# Patient Record
(demographics unavailable — no encounter records)

---

## 2024-10-16 NOTE — ASSESSMENT
[FreeTextEntry1] : will get carotid doppler as pt has requested it, has not had it in the past, last LDL was at goal of 95, HDL low at 40,

## 2024-10-16 NOTE — PHYSICAL EXAM
[Normal Sclera/Conjunctiva] : normal sclera/conjunctiva [Normal Outer Ear/Nose] : the outer ears and nose were normal in appearance [No JVD] : no jugular venous distention [No Respiratory Distress] : no respiratory distress  [No Accessory Muscle Use] : no accessory muscle use [Clear to Auscultation] : lungs were clear to auscultation bilaterally [Normal Rate] : normal rate  [Regular Rhythm] : with a regular rhythm [Normal S1, S2] : normal S1 and S2 [Normal] : normal rate, regular rhythm, normal S1 and S2 and no murmur heard [No Edema] : there was no peripheral edema [Soft] : abdomen soft [Non Tender] : non-tender [Normal Bowel Sounds] : normal bowel sounds [No Focal Deficits] : no focal deficits [Normal Affect] : the affect was normal [Normal Insight/Judgement] : insight and judgment were intact [Normal TMs] : both tympanic membranes were normal [No Murmur] : no murmur heard [No Carotid Bruits] : no carotid bruits [No Masses] : no abdominal mass palpated [Normal Posterior Cervical Nodes] : no posterior cervical lymphadenopathy [Normal Anterior Cervical Nodes] : no anterior cervical lymphadenopathy [No CVA Tenderness] : no CVA  tenderness [No Spinal Tenderness] : no spinal tenderness [No Joint Swelling] : no joint swelling [Grossly Normal Strength/Tone] : grossly normal strength/tone [No Rash] : no rash [Coordination Grossly Intact] : coordination grossly intact [Speech Grossly Normal] : speech grossly normal [Normal Mood] : the mood was normal [de-identified] : reg at present [de-identified] : rectus diastesis

## 2024-10-16 NOTE — HISTORY OF PRESENT ILLNESS
[PMH Reviewed and Updated] : past medical history reviewed and updated [PSH Reviewed and Updated] : past surgical history reviewed and updated [Family History Reviewed and Updated] : family history reviewed and updated [Medication and Allergies Reconciled] : medication and allergies reconciled [0] : 0 [Retired] : retired from work [None] : The patient has no concerns about alcohol abuse [Never] : has never used illicit drugs [Walking] : walking [Compliant with medications] : compliant with medications [Spouse] : spouse [Children] : children [Extended Family] : extended family [Fully Independent] : fully independent [Drives without concerns] : drives without concerns [No history of falls] : no history of falls [Seatbelts] : seatbelts [Safe Driving Habits] : safe driving habits [Smoke Detectors] : smoke detectors [Carbon Monoxide Detector] : carbon monoxide detector [Hot Water Temp <120F] : hot water temp <120F [Sunscreen] : sunscreen [Patient Has Full Capacity] : this patient has full decision making capacity for discussion of advance care planning [Patient Has Designated Health Care Proxy/Advanced Directive] : patient has designated Health Care Proxy/Advanced Directive [Over the Past 2 Weeks, Have You Felt Down, Depressed, or Hopeless?] : 1.) Over the past 2 weeks, have you felt down, depressed, or hopeless? No [Over the Past 2 Weeks, Have You Felt Little Interest or Pleasure Doing Things?] : 2.) Over the past 2 weeks, have you felt little interest or pleasure doing things? No [FreeTextEntry2] : none [de-identified] : none [de-identified] : none [FreeTextEntry1] : annual wellness [de-identified] : Pt is here for annual wellness. He has been seeing Dr Erwin for back pain and weakness of the legs.  Has not exercised due to the pains.  No cardiac complaints at present. Recently took methlyprednisone that he had from his rheumatologist which helped his pain. . No recent issues with bleeding or bruising, complaint with meds

## 2024-10-23 NOTE — DISCUSSION/SUMMARY
[de-identified] : Chief complaint Back pain follow-up  HPI Patient is a very pleasant 71-year-old here for follow-up of back pain.  The patient comes with an MRI.  He has leg pain bilateral buttock hamstring to the calf.  Pain is worse with standing and walking.  Feels better when he sits.  He had epidurals in the past.  No weakness.  No issues with gait or balance bowel bladder incontinence.  Examination Patient is NAD, AAOX3 skin is intact, NTTP in LS SPINE mild pain with ROM 5/5 motor strength in BLE SILT L1-S1 BLE POSITIVE straight leg raise Negative leslie equal patella/achilles reflex normal gait  Imaging MRI of the lumbar spine demonstrates L3-4 central disc herniation at acute, moderate stenosis, L5-S1 DDD with neuroforaminal stenosis, L2 L3-L4-L5 neuroforaminal stenosis moderate.  No fracture.  Treatment note I discussed at length with the patient nature of the condition.  The patient presents with back and bilateral leg issues, and the MRI does demonstrate stenosis most pronounced at the L3-4 L4-5 level.  At this time we will begin with gabapentin 300 mg and the patient will see Bessie for a epidural.  We discussed that he will need to be off the blood thinner.  If he fails the injections, he is a candidate for surgery.  All the patient's questions were sought and answered.

## 2024-11-04 NOTE — HISTORY OF PRESENT ILLNESS
[Back Pain] : back pain [___ mths] : [unfilled] month(s) ago [Constant] : constant [5] : an average pain level of 5/10 [0] : a minimum pain level of 0/10 [7] : a maximum pain level of 7/10 [Aching] : aching [Shooting] : shooting [Sitting] : sitting [Standing] : standing [Walking] : walking [Rest] : rest [FreeTextEntry1] : HPI: Mr. LILI DENNIS is a 72 year M on Xarelto with pmhx of DVT x 3, PAF, RTK replacement, hx prostate CA. Complaints of anterior thigh pain, right greater than left worse with prolonged sitting, walking, standing. Does not wish to take medications due to concern of side effects. Pain is severe and impacting his quality of life and ability to perform ADL's. Denies any additional weakness, numbness, bowel/bladder dysfunction, history of bleeding disorders. Denies any additional weakness, numbness, bowel/bladder dysfunction, history of bleeding disorders. Has had shoulder and knee replacement surgery as well as achilles repair. There has been a severe exacerbation of the patient's chronic pain.

## 2024-11-04 NOTE — ASSESSMENT
[FreeTextEntry1] : 72 yom presents w/ severe back and leg pain. Quality of life is impaired. There has been a severe exacerbation of the patient's chronic pain.  I have personally reviewed the patient's MRI in detail and discussed my personal interpretation of the study which is significant for severe stenosis at L3-L4.  Patient is on Eliquis. Will need to discuss with his prescribing provider whether the potential benefits of intervention outweigh the risk of discontinuing Eliquis for 72 hours prior to intervention.  The patient has failed to have relief with medication management. The patient has failed to have relief with more then six weeks of physical therapy within the last three months. Given the patients failure to improve with all other conservative measures, recommend bilateral L3-L4 transforaminal epidural steroid injection under fluoroscopic guidance. The patient will follow-up with me in my office two weeks following intervention.  I have discussed in detail with the patient that an interventional spine procedure is associated with potential risks. The procedure may include an injection of steroid and potentially other medications (local anesthetic and normal saline) into the epidural space or surrounding tissue of the spine. There are significant risks of this procedure which include and are not limited to infection, bleeding, worsening pain, dural puncture leading to post-dural puncture headache, nerve damage, spinal cord injury, paralysis, stroke, and death. There is a chance that the procedure does not improve their pain. There are risks associated with the steroid being absorbed into the body systemically. These include dysphoria, difficulty sleeping, mood swings, and personality changes. Pre-menopausal women may notice a regularity his in her menstrual cycle for 2-3 months following the injection. Steroids can specifically affect patients with hypertension, diabetes, and peptic ulcers. The procedure may cause a temporary increase in blood pressure and blood glucose, and may adversely affect a peptic ulcer. Other, more rare complications, including avascular necrosis of the joints, glaucoma, and osteoporosis. I have discussed the risks of the procedure at length with the patient, and the potential benefits of pain relief. I have offered alternatives to the procedure. All questions were answered. The patient expressed understanding and wishes to proceed with the procedure.  Physical therapy prescribed - goal will be to increase ROM, strengthening, postural training, other modalities ad gwen which may include massage and stim. Goals of therapy discussed with the patient in detail and will be discussed with physical therapist. Patient will follow-up following course of physical therapy to monitor progress and adjust therapy as needed.  Acetaminophen 1,000 mg q8h prn for moderate pain. Risks, benefits, and alternatives of acetaminophen discussed with patient.  Ibuprofen 600 mg q8h prn add when pain is not adequately controlled with acetaminophen. Risks, benefits, and alternatives of ibuprofen discussed with patient.  Diet and nutritional strategies discussed which may improve patients pain and will improve overall health.

## 2024-11-04 NOTE — REASON FOR VISIT
[Initial Consultation] : an initial pain management consultation [FreeTextEntry2] : Ref by Dr. Erwin

## 2024-11-04 NOTE — DATA REVIEWED
[FreeTextEntry1] : Exam Date:      10/13/24 Exam:         MRI LUMBAR SPINE   This exam is compared prior CT scan the abdomen performed on June 29, 2017   Reversal of the normal lumbar lordosis is seen   The vertebral body height appears normal   Disc desiccation is seen involving the T11-12, T12-L1, L2-3, L3-4, L4-5 and L5-S1 level. This is most prominent at the L5-S1 level and secondary chronic degenerative changes   There is increased signal seen involving the L1-2 disc space which could be secondary to demineralization that was present on the prior CT scan of the abdomen/pelvis.   L2 is slightly posterior displaced relative to L3 which likely the result of chronic degenerative changes   T12-L1: Disc bulge is seen which more prominent left side. Far lateral disc herniation is seen on the left side (16-6) mild narrowing spinal canal both neural foramen   L1-2: Bilateral hypertrophic facet joint changes. Mild narrowing of the spinal canal. Mild to moderate narrowing of the left neural foramen and moderate narrowing of the right neural foramen   L2-3: Disc bulge and bilateral hypertrophic facet. Moderate narrowing of the spinal canal. Moderate narrowing of the left neural foramen and severe narrowing of the right neural foramen   L3-4: Disc bulge is seen with associated annular fissure. Bilateral hypertrophic facet joint change. Moderate to severe narrowing of the spinal canal. Moderate narrowing of both neural foramen   L4-5: Disc bulge is seen with associated annular fissure. Bilateral hypertrophic facet changes. Moderate narrowing of the spinal canal. Moderate to severe narrowing of both neural foramen   L5-S1: Disc bulge and tiny central disc protrusion is seen. Bilateral hypertrophic facet joint change. Mild narrowing right neural foramen and moderate narrowing of the left neural foramen   The conus ends at bottom of L1 appears normal.   Elevation of the paraspinal soft tissues appear normal.   Bilateral renal cysts are identified   Small foci of decreased signal is seen involving both iliac bones. These finds correspond to sclerotic areas seen on the prior CT scan and could be compatible bone island though continued close interval can be done to show stability.   IMPRESSION: Degenerative changes as described above.

## 2024-11-04 NOTE — PHYSICAL EXAM
[de-identified] : Constitutional: Well-developed, in no acute distress  Musculoskeletal: Lumbar Spine:   Gait: Antalgic 		Inspection: Normal curvature, no abnormal kyphosis or scoliosis 		Facet loading: pain bilaterally 		Palpation: 			Lumbar and paraspinal muscles: pain bilaterally 			Sacroiliac joint: no pain 			Greater trochanter: no pain 		Muscle Strength: 		Iliopsoas: 5/5 bilaterally 		Quadriceps: 5/5 bilaterally 		Hamstrings: 5/5 bilaterally 		Tibialis anterior: 5/5 bilaterally 		Extensor hallucis longus: 5/5 bilaterally  		Sensation: normal and equal in bilateral lower extremities  Extremity: no edema noted Neurological: Memory normal, AAO x 3, Cranial nerves II - XII grossly normal Psychiatric: Appropriate mood and affect, oriented to time, place, person, and situation

## 2024-11-04 NOTE — CONSULT LETTER
[Dear  ___] : Dear  [unfilled], [Consult Letter:] : I had the pleasure of evaluating your patient, [unfilled]. [Please see my note below.] : Please see my note below. [Consult Closing:] : Thank you very much for allowing me to participate in the care of this patient.  If you have any questions, please do not hesitate to contact me. [Sincerely,] : Sincerely, [FreeTextEntry3] : Víctor Marsh MD

## 2024-11-22 NOTE — HISTORY OF PRESENT ILLNESS
[FreeTextEntry1] : 72-year-old man  Preoperative cardiovascular examination previously followed here for atrial fibrillation hypertension valvular heart disease hyperlipidemia and aortic dilatation.   Back pain attributed to severe lumbar stenosis led to a recommendation for an epidural steroid injection for treatment.  The  is Dr. Salma Ruffin denies symptoms of chest pain palpitation shortness of breath or syncope.  No bleeding complications while taking rivaroxaban.  Mr. Arroyo is accompanied today by his wife

## 2024-11-22 NOTE — DISCUSSION/SUMMARY
[FreeTextEntry1] : Preoperative cardiovascular examination. Back pain attributed to severe lumbar stenosis led to a recommendation for an epidural steroid injection for treatment.  The  is Dr. Marsh  Comment There is no cardiac contraindication to epidural injection.  There is no history of a myocardial infarction.  The patient is hemodynamically stable without any cardiac related symptoms.  There is no evidence of heart failure.  The preoperative 11/22/2024 electrocardiogram shows no evidence of myocardial ischemia or infarction.  As such procedure may be performed with an acceptable cardiac risk.  In an effort to decrease the risk of potential bleeding complications rivaroxaban may be held prior to the procedure and reinstituted as soon as feasible following its completion.  I have recommended the following Discontinue rivaroxaban 4 days prior to epidural injection Reinstitution of rivaroxaban as soon as feasible following the completion of the procedure at the direction of Dr. Marsh Further cardiac testing is not required Workup and treatment as directed by Dr. Marsh Call if any cardiovascular issues arise   Paroxysmal atrial fibrillation The working diagnosis is paroxysmal atrial fibrillation secondary to hypertensive heart disease exacerbated by obesity.  He has been maintained on flecainide/xarelto  with a favorable clinical response.  A 3/22 Zio patch showed sinus rhythm with no sustained rhythm disturbance.  No atrial fibrillation.  I have recommended the following: Continue flecainide and rivaroxaban No further cardiac testing for this problem at this time.    Hyperlipidemia Hyperlipidemia represents a risk factor for atherosclerotic heart disease.  The target LDL level for primary prevention is about 100.  In 10/24 the serum triglyceride level was 187 cholesterol 186 HDL 42 and .  The main clinical decision involves whether or not to institute antihyperlipidemic medical therapy.  I have recommended the following: Low-salt low-fat low-cholesterol heart healthy diet.  Regular aerobic exercise and weight loss No antihyperlipidemic medical therapy at this time Target LDL level to about 100     Hypertension Hypertension is reportedly controlled on the present medical regimen.  (verapamil) ACE-I/ARB therapy may be added to the present medical regimen if required to maintain optimal levels .  I have recommended the following: Continue the present medical regimen home blood pressure monitoring Addition of ACE-I/ARB therapy if required to maintain optimal levels as discussed above.    Valvular heart disease The 5/24 echocardiogram revealed mild aortic sclerosis with trivial-mild aortic and trace tricuspid regurgitation.  The pulmonary artery systolic pressure was normal at 32 mmHg.  The left ventricular ejection fraction was 65%.  The present cardiac physical  examination is not suggestive of hemodynamically significant valvular pathology.  I have recommended the following: No further cardiac testing for this problem    Ascending aortic dilatation. The 5/24 echocardiogram revealed a dilated aortic root at 3.7 cm.  And ascending aorta 4.3 cm.  Control of systemic hypertension will be important in an effort to avoid expansion of the aorta.  I have recommended the following: No further cardiac testing for this problem See hypertension entry above   The diagnosis, prognosis, risks, options and alternatives were explained at length to the patient and family.    All questions were answered.  Issues discussed included cardiology clearance prior to surgery atrial fibrillation hyperlipidemia ascending aortic dilatation noninvasive cardiac testing hypertension diet and exercise Counseling and/or coordination of care.  Time was a significant factor for this patient encounter.  Total time spent with the patient and family was 25 minutes.  Greater than 50% of the time was devoted to counseling and/or coordination of care

## 2024-11-22 NOTE — PHYSICAL EXAM
[Normal Conjunctiva] : normal conjunctiva [Normal S1, S2] : normal S1, S2 [Clear Lung Fields] : clear lung fields [Soft] : abdomen soft [Non Tender] : non-tender [Normal Gait] : normal gait [No Rash] : no rash [No Focal Deficits] : no focal deficits [de-identified] : Appears in no distress lying flat [de-identified] : Normocephalic [de-identified] : No neck vein distention.  No carotid bruit [de-identified] : No murmur no gallop no diastolic sounds. [de-identified] : Full range of motion [de-identified] : No peripheral edema.  Dorsalis pedis pulses +2 bilaterally

## 2024-12-02 NOTE — PROCEDURE
[FreeTextEntry1] : PROCEDURE: Bilateral L3-L4 transforaminal epidural steroid injection under fluoroscopic guidance.  CHIEF COMPLAINT: Low back and leg pain.    PREOPERATIVE DIAGNOSIS:	Lumbar radiculopathy.  POSTOPERATIVE DIAGNOSIS:  Lumbar radiculopathy. 	  ANESTHESIA:  Local.  COMPLICATIONS:  	None.  ESTIMATED BLOOD LOSS:  None.	  INDICATIONS: Mr. Arroyo is a very pleasant 72 yom who has significant low back and leg pain.  The patient has failed to have relief with medication management and physical therapy. Given their failure to improve with all other conservative measures, it was determined that the patient would benefit from a transforaminal epidural steroid injection under fluoroscopic guidance.    The patient denies any fevers, chills, nausea, vomiting, diarrhea, constipation, chest pain, shortness of breath, or burning on urination.  They deny any latex allergy.  They are not taking any anticoagulants and do not have a history of coagulopathy.  The patient denies any IV contrast allergy.       PROCEDURE COURSE: The risks, benefits, and alternatives of the lumbar interlaminar epidural steroid injection were explained to the patient.  All questions were answered to their own satisfaction.  Written consent was signed and placed in the chart. The patients low back was marked in the preoperative holding area.   The patient was brought to the Operating Room and placed in the prone position with a pillow under the abdomen to reduce lumbar lordosis.  A time-out was taken identifying the patient, the procedure, the site and side, and the patients allergies.  ASA standard monitors were applied for monitoring throughout the procedure.  The patients back was prepped and draped with Chloroprep in the usual sterile fashion and sterile technique was adhered to throughout the entire procedure.  The lumbar spine was visualized under fluoroscopy in the AP view. The 12th rib and T12 vertebra were identified as a reference point and the lumbar vertebral bodies were counted.  The L3 vertebral body was then squared. The vertebral body and the superior and inferior end plates were aligned and the spinous processes were adjusted until midline. The fluoroscope was then obliqued approximately twenty degrees to the side of the target foramen and the L3-L4 foramen was identified under fluoroscopic guidance. The skin and subcutaneous tissues were infiltrated with 1% Lidocaine.  After adequate local anesthesia was obtained, a 22g needle was advanced toward the foramen. The needle was carefully advanced, alternating between AP and lateral views, to ensure appropriate trajectory and depth. The needle was advanced just under the pedicle and this was confirmed on lateral view. Once the foramen was accessed, negative aspiration for blood and CSF was obtained, further confirming location. Following this, 1 cc of contrast was administered and epidural spread was confirmed in the AP and lateral views. Following this, 7.5 mg of dexamethasone and 1 cc of 1% lidocaine was slowly administered in incremental amounts.  Following this attention was turned to the right L3-L4 foramen. The same exact sequence of events was performed and the same exact medications administered as was done on the left L3-L4 foramen.   All injections were done with negative aspiration for cerebrospinal fluid or heme, and all needles were withdrawn without incident. I personally met with the patient following the procedure and all questions were answered. The patient tolerated the procedure well without any apparent complications and was transferred to the Recovery Room in stable condition. The patient was provided with discharge instructions and will follow-up with me in my office.  	___________________________________ 	Víctor Marsh M.D.

## 2024-12-16 NOTE — PHYSICAL EXAM
[de-identified] : Constitutional: Well-developed, in no acute distress  Musculoskeletal: Lumbar Spine:   Gait: Antalgic 		Inspection: Normal curvature, no abnormal kyphosis or scoliosis 		Facet loading: pain bilaterally 		Palpation: 			Lumbar and paraspinal muscles: pain bilaterally 			Sacroiliac joint: no pain 			Greater trochanter: no pain 		Muscle Strength: 		Iliopsoas: 5/5 bilaterally 		Quadriceps: 5/5 bilaterally 		Hamstrings: 5/5 bilaterally 		Tibialis anterior: 5/5 bilaterally 		Extensor hallucis longus: 5/5 bilaterally  		Sensation: normal and equal in bilateral lower extremities  Extremity: no edema noted Neurological: Memory normal, AAO x 3, Cranial nerves II - XII grossly normal Psychiatric: Appropriate mood and affect, oriented to time, place, person, and situation

## 2024-12-16 NOTE — ASSESSMENT
[FreeTextEntry1] : 72 yom presents w/ severe back and leg pain with resolution of pain after SABRINA.  I have personally reviewed the patient's MRI in detail and discussed my personal interpretation of the study which is significant for severe stenosis at L3-L4.  Patient is on Eliquis. Will need to discuss with his prescribing provider whether the potential benefits of intervention outweigh the risk of discontinuing Eliquis for 72 hours prior to intervention, if repeat SABRINA is performed.  Follow-up w/ Dr. Erwin.  Physical therapy prescribed - goal will be to increase ROM, strengthening, postural training, other modalities ad gwen which may include massage and stim. Goals of therapy discussed with the patient in detail and will be discussed with physical therapist. Patient will follow-up following course of physical therapy to monitor progress and adjust therapy as needed.  Acetaminophen 1,000 mg q8h prn for moderate pain. Risks, benefits, and alternatives of acetaminophen discussed with patient.  Ibuprofen 600 mg q8h prn add when pain is not adequately controlled with acetaminophen. Risks, benefits, and alternatives of ibuprofen discussed with patient.  Diet and nutritional strategies discussed which may improve patients pain and will improve overall health.  Based on the current evaluation and management, I will provide ongoing, longitudinal care for the complex painful condition described above. Patient is encouraged to reach out with any questions and/or concerns regarding their condition and care.

## 2024-12-16 NOTE — PHYSICAL EXAM
[de-identified] : Constitutional: Well-developed, in no acute distress  Musculoskeletal: Lumbar Spine:   Gait: Antalgic 		Inspection: Normal curvature, no abnormal kyphosis or scoliosis 		Facet loading: pain bilaterally 		Palpation: 			Lumbar and paraspinal muscles: pain bilaterally 			Sacroiliac joint: no pain 			Greater trochanter: no pain 		Muscle Strength: 		Iliopsoas: 5/5 bilaterally 		Quadriceps: 5/5 bilaterally 		Hamstrings: 5/5 bilaterally 		Tibialis anterior: 5/5 bilaterally 		Extensor hallucis longus: 5/5 bilaterally  		Sensation: normal and equal in bilateral lower extremities  Extremity: no edema noted Neurological: Memory normal, AAO x 3, Cranial nerves II - XII grossly normal Psychiatric: Appropriate mood and affect, oriented to time, place, person, and situation

## 2024-12-16 NOTE — HISTORY OF PRESENT ILLNESS
[Back Pain] : back pain [___ mths] : [unfilled] month(s) ago [Constant] : constant [5] : an average pain level of 5/10 [0] : a minimum pain level of 0/10 [7] : a maximum pain level of 7/10 [Aching] : aching [Shooting] : shooting [Sitting] : sitting [Standing] : standing [Walking] : walking [Rest] : rest [FreeTextEntry1] : Interval history: Pt returns today s/p SABRINA. He reports complete relief of pain. NO side effects.  HPI: Mr. LILI DENNIS is a 72 year M on Xarelto with pmhx of DVT x 3, PAF, RTK replacement, hx prostate CA. Complaints of anterior thigh pain, right greater than left worse with prolonged sitting, walking, standing. Does not wish to take medications due to concern of side effects. Pain is severe and impacting his quality of life and ability to perform ADL's. Denies any additional weakness, numbness, bowel/bladder dysfunction, history of bleeding disorders. Denies any additional weakness, numbness, bowel/bladder dysfunction, history of bleeding disorders. Has had shoulder and knee replacement surgery as well as achilles repair. There has been a severe exacerbation of the patient's chronic pain.  Interventions: Bilateral L3-L4 TFESI (12/02/24):

## 2024-12-18 NOTE — PHYSICAL EXAM
[de-identified] : right knee no effusion skin intact rom 0-135 without pain stable no effusion right hip with painful restricted rom neuro intact nl gait [de-identified] : ap/pa/lat/sunrise right knee shows well positioned tka  ap/frog right hip and pelvis shows medial and inferior narrowing right hip lateral cortex lesion right proximal femur

## 2024-12-18 NOTE — HISTORY OF PRESENT ILLNESS
[de-identified] : right knee pain for a few months had epidural a month or so ago and helped a little no f/c/ns no numbness or tingling no weight loss no trauma

## 2024-12-18 NOTE — DISCUSSION/SUMMARY
[de-identified] : pain possibly due to spine or hip lesion in femur need to get mri to eval cortex if benign then coming from hip or spine will consider diagnostic/theraputic injection

## 2025-01-03 NOTE — DISCUSSION/SUMMARY
[de-identified] : Chief complaint Follow-up  HPI The patient is here today for follow-up of his back pain.  The patient got an MRI of his right hip and now is having severe pain down his back into his right hip and right leg.  There is numbness and tingling.  It radiates into the hamstring to the posterolateral calf.  He has had 2 epidural injections.  He has back soreness but overall his biggest complaint is right leg.  No weakness.  No issues gait or balance bowel bladder incontinence.  Examination Patient is NAD, AAOX3 skin is intact, NTTP in LS SPINE mild pain with ROM 5/5 motor strength in BLE SILT L1-S1 BLE POSITIVE straight leg raise Negative leslie equal patella/achilles reflex normal gait  Imaging Review of previous MRI demonstrates L3-L4 severe stenosis with herniated disc and facet hypertrophy, L4-5 moderate stenosis.  There is L5-S1 DDD without significant neuroforaminal stenosis  Treatment note I discussed at length with the patient nature of the condition.  The patient presents with back and right leg pain but mostly right leg symptoms in the setting of L3-L5 stenosis.  At this time he is failed 2 injections.  The patient is a good candidate for a L3-L5 right-sided decompression and L3-4 discectomy.  Risk the procedure were discussed which include mount infection bleeding nerve injury injury to dural sac possible nonunion possible need for revision surgery risk of anesthesia clued death and paralysis.  After discussion of risk and benefits the patient provided consent to proceed.  The patient will need full medical clearance.  He needs cardiac clearance.  All the patient's questions were sought answered.

## 2025-01-08 NOTE — HISTORY OF PRESENT ILLNESS
[FreeTextEntry1] : 72-year-old man Preoperative cardiovascular examination Previously followed here for atrial fibrillation hypertension valvular heart disease hyperlipidemia and aortic dilatation.  Augustin carries a diagnosis of lumbar stenosis/lumbar disc disease.  Despite 2 epidural injections the last of which was in 12/24 persistent pain in the right hip/back into the right leg has persisted.  Symptoms are severe despite analgesics.  Surgical intervention has been advised by Dr. Erwin.  The operation is scheduled for 2/6/2025..  Augustin denies symptoms of chest pain, shortness of breath, palpitations or syncope.

## 2025-01-08 NOTE — PHYSICAL EXAM
[Normal Conjunctiva] : normal conjunctiva [Normal S1, S2] : normal S1, S2 [Clear Lung Fields] : clear lung fields [Soft] : abdomen soft [Non Tender] : non-tender [Normal Gait] : normal gait [No Rash] : no rash [No Focal Deficits] : no focal deficits [de-identified] : Appears in no distress lying flat [de-identified] : Normocephalic [de-identified] : No neck vein distention.  No carotid bruit [de-identified] : No murmur no gallop no diastolic sounds. [de-identified] : Full range of motion [de-identified] : No peripheral edema.  Dorsalis pedis pulses +2 bilaterally

## 2025-01-08 NOTE — PHYSICAL EXAM
[Normal Conjunctiva] : normal conjunctiva [Normal S1, S2] : normal S1, S2 [Clear Lung Fields] : clear lung fields [Soft] : abdomen soft [Non Tender] : non-tender [Normal Gait] : normal gait [No Rash] : no rash [No Focal Deficits] : no focal deficits [de-identified] : Appears in no distress lying flat [de-identified] : Normocephalic [de-identified] : No neck vein distention.  No carotid bruit [de-identified] : No murmur no gallop no diastolic sounds. [de-identified] : Full range of motion [de-identified] : No peripheral edema.  Dorsalis pedis pulses +2 bilaterally

## 2025-01-08 NOTE — DISCUSSION/SUMMARY
[FreeTextEntry1] : Preoperative cardiovascular examination  Augustin carries a diagnosis of lumbar stenosis/lumbar disc disease.  Despite 2 epidural injections, the last of which was in 12/24 pain in the right hip/back into the right leg has persisted.  Symptoms are severe despite analgesics.  Surgical intervention has been advised by Dr. Erwin.  The operation is scheduled for 2/6/2025..  Comment There is no cardiac contraindication to epidural injection.  There is no history of a myocardial infarction.  The patient is hemodynamically stable without any cardiac related symptoms.  There is no evidence of heart failure.  The preoperative 1/7/25 electrocardiogram shows no evidence of myocardial ischemia or infarction.  As such procedure may be performed with an acceptable cardiac risk.  In an effort to decrease the risk of potential bleeding complications rivaroxaban may be held prior to the procedure and reinstituted as soon as feasible following its completion.  I have recommended the following Discontinue rivaroxaban 2/3/5 ( last dose 2/2/2025) Reinstitution of rivaroxaban as soon as feasible following the completion of the procedure at the direction of Dr. Erwin Further cardiac testing is not required Workup and treatment as directed by Dr. Erwin Call if any cardiovascular issues arise   Paroxysmal atrial fibrillation The working diagnosis is paroxysmal atrial fibrillation secondary to hypertensive heart disease exacerbated by obesity.  He has been maintained on flecainide/xarelto  with a favorable clinical response.  A 3/22 Zio patch showed sinus rhythm with no sustained rhythm disturbance.  No atrial fibrillation.  I have recommended the following: Continue flecainide and rivaroxaban No further cardiac testing for this problem at this time.    Hyperlipidemia Hyperlipidemia represents a risk factor for atherosclerotic heart disease.  The target LDL level for primary prevention is about 100.  In 10/24 the serum triglyceride level was 187 cholesterol 186 HDL 42 and .  The main clinical decision involves whether or not to institute antihyperlipidemic medical therapy.  I have recommended the following: Low-salt low-fat low-cholesterol heart healthy diet.  Regular aerobic exercise and weight loss No antihyperlipidemic medical therapy at this time Target LDL level to about 100     Hypertension Hypertension is reportedly controlled on the present medical regimen.  (verapamil) ACE-I/ARB therapy may be added to the present medical regimen if required to maintain optimal levels .  I have recommended the following: Continue the present medical regimen home blood pressure monitoring Addition of ACE-I/ARB therapy if required to maintain optimal levels as discussed above.    Valvular heart disease The 5/24 echocardiogram revealed mild aortic sclerosis with trivial-mild aortic and trace tricuspid regurgitation.  The pulmonary artery systolic pressure was normal at 32 mmHg.  The left ventricular ejection fraction was 65%.  The present cardiac physical  examination is not suggestive of hemodynamically significant valvular pathology.  I have recommended the following: No further cardiac testing for this problem    Ascending aortic dilatation. The 5/24 echocardiogram revealed a dilated aortic root at 3.7 cm.  And ascending aorta 4.3 cm.  Control of systemic hypertension will be important in an effort to avoid expansion of the aorta.  I have recommended the following: No further cardiac testing for this problem See hypertension entry above    Overweight Being overweight exacerbates Augustin's cardiovascular issues.  Today Mr. Arroyo is 5 feet 9 inches tall and weighs 216 pounds.  Diet exercise and weight loss are advised.     The diagnosis, prognosis, risks, options and alternatives were explained at length to the patient and family.    All questions were answered.  Issues discussed included cardiology clearance prior to surgery atrial fibrillation hyperlipidemia ascending aortic dilatation noninvasive cardiac testing hypertension diet and exercise Counseling and/or coordination of care.  Time was a significant factor for this patient encounter.  Total time spent with the patient and family was 25 minutes.  Greater than 50% of the time was devoted to counseling and/or coordination of care

## 2025-01-27 NOTE — REVIEW OF SYSTEMS
[Palpitations] : palpitations [Negative] : Heme/Lymph [Fatigue] : no fatigue [Joint Pain] : no joint pain [Joint Stiffness] : no joint stiffness [Muscle Pain] : no muscle pain [Muscle Weakness] : no muscle weakness [FreeTextEntry5] : chronic afib  [FreeTextEntry9] : Persistent right leg muscle twitching.  0

## 2025-01-27 NOTE — CONSULT LETTER
[Dear  ___] : Dear  [unfilled], [Consult Letter:] : I had the pleasure of evaluating your patient, [unfilled]. [Please see my note below.] : Please see my note below. [Consult Closing:] : Thank you very much for allowing me to participate in the care of this patient.  If you have any questions, please do not hesitate to contact me. [Sincerely,] : Sincerely, [FreeTextEntry3] : Sarah Charles MD\par  [facility] [place] [facility]\par

## 2025-01-27 NOTE — REVIEW OF SYSTEMS
[Palpitations] : palpitations [Negative] : Heme/Lymph [Fatigue] : no fatigue [Joint Pain] : no joint pain [Joint Stiffness] : no joint stiffness [Muscle Pain] : no muscle pain [Muscle Weakness] : no muscle weakness [FreeTextEntry5] : chronic afib  [FreeTextEntry9] : Persistent right leg muscle twitching.

## 2025-01-27 NOTE — HISTORY OF PRESENT ILLNESS
[de-identified] : 66 year old male referred by Dr. Lewis for anemia.  Labs dated 4/2/2019 show a HGB 9.7, HCT 35.4, MCV 69.4, Ferritin 8, serum iron 21, % sat 6, vitamin B12 334.  Patient sates he has mild Crohns disease/IBS sees Dr. Mack.  Last colonoscopy was fall 2018 which was normal besides some polyps.  Patient has a history of 3 DVT's due to Testosterone injections on Xarelto.  He did see a hematologist in the past approximately 10 years ago when he got the first DVT.  He is unsure of workup (chart states he has MTHFR gene mutation)  He had rotator cuff repair surgery in Feb- since then he has some fatigue, and RLS- He started slow FE 6 days ago and he has noticed an improvement in his symptoms. \par  \par  Patient also has a history of prostate cancer (Sept 2016) [id number]-Rednwgz's (3+3) 6 adenocarcinoma involving 3% of the one core, left apex- not treatment "watch and wait."  He sees urology q 3 months for monitoring.   [de-identified] : Pt being seen today for follow-up of anemia.  Having back procedure on 2/6 with ortho.

## 2025-01-27 NOTE — HISTORY OF PRESENT ILLNESS
[de-identified] : 66 year old male referred by Dr. Lewis for anemia.  Labs dated 4/2/2019 show a HGB 9.7, HCT 35.4, MCV 69.4, Ferritin 8, serum iron 21, % sat 6, vitamin B12 334.  Patient sates he has mild Crohns disease/IBS sees Dr. Mack.  Last colonoscopy was fall 2018 which was normal besides some polyps.  Patient has a history of 3 DVT's due to Testosterone injections on Xarelto.  He did see a hematologist in the past approximately 10 years ago when he got the first DVT.  He is unsure of workup (chart states he has MTHFR gene mutation)  He had rotator cuff repair surgery in Feb- since then he has some fatigue, and RLS- He started slow FE 6 days ago and he has noticed an improvement in his symptoms. \par  \par  Patient also has a history of prostate cancer (Sept 2016) W9w-Jomihyn's (3+3) 6 adenocarcinoma involving 3% of the one core, left apex- not treatment "watch and wait."  He sees urology q 3 months for monitoring.   [de-identified] : Pt being seen today for follow-up of anemia.  Having back procedure on 2/6 with ortho.

## 2025-01-27 NOTE — ASSESSMENT
[FreeTextEntry1] : Patient referred by DR. Livan Lewis for evaluation of anemia of iron deficiency.  Patient has h/o mild Crohn's disease and is on omeprazole for over 20 years.  He has h/o DVT x 3 with no clinical evidence of PE which was attributed to use of testosterone supplementation.  The hypercoagulable work up revealed MTHFR mutation which is not considered as a risk for thrombosis at this point.  He is on rivaroxaban 20 mg daily for A- fib.Hypercoag w/u positive for lupus anticoagulant- but might be false positive in patient on DOAC.  He will continue Xarelto for Afib- he was instructed to hold x 4 days prior to surgery   #back pain- having right sided decompression and discectomy on 2/6 with ortho  Iron deficiency anemia - blood loss on DOAC and Crohns disease.   - GI w/u April 2019, Sept 2018, Dec 2021 (Crohn's )  - s/p IV iron- February 2020 Sept 2021, April 2022, October 2022  - Hg 13.5>> 11.9> 11.6> 12.3> 11.2 -ferritin 28, sat 9% from June s/p  iron- rpt today  - symptomatic anemia, - schedule IV injectafer x 2 - 7/24  # Mr. Arroyo has significant family h/o of mother and maternal aunt and uncle with h/o renal cell ca ( unknown subtype). He has prostate ca [place] 6 under observation- will repeat PSA today, saw Dr. Bear last month.  PSA 3.4  Genetic testing with [facility] - negative   # CT scan HARSH nodule - repeat CT scan in May 2024, suspected infection, resolved infiltrate   cbc chem irons and PSA  Blood drawn in office. Ordered and reviewed.  Case and mgmt discussed with Dr. Charles

## 2025-01-27 NOTE — CONSULT LETTER
[Dear  ___] : Dear  [unfilled], [Consult Letter:] : I had the pleasure of evaluating your patient, [unfilled]. [Please see my note below.] : Please see my note below. [Consult Closing:] : Thank you very much for allowing me to participate in the care of this patient.  If you have any questions, please do not hesitate to contact me. [Sincerely,] : Sincerely, [FreeTextEntry3] : Sarah Charles MD\par  [facility] [place] at [facility]\par

## 2025-01-27 NOTE — ASSESSMENT
[FreeTextEntry1] : Patient referred by DR. Livan Lewis for evaluation of anemia of iron deficiency.  Patient has h/o mild Crohn's disease and is on omeprazole for over 20 years.  He has h/o DVT x 3 with no clinical evidence of PE which was attributed to use of testosterone supplementation.  The hypercoagulable work up revealed MTHFR mutation which is not considered as a risk for thrombosis at this point.  He is on rivaroxaban 20 mg daily for A- fib.Hypercoag w/u positive for lupus anticoagulant- but might be false positive in patient on DOAC.  He will continue Xarelto for Afib- he was instructed to hold x 4 days prior to surgery   #back pain- having right sided decompression and discectomy on 2/6 with ortho  Iron deficiency anemia - blood loss on DOAC and Crohns disease.   - GI w/u April 2019, Sept 2018, Dec 2021 (Crohn's )  - s/p IV iron- February 2020 Sept 2021, April 2022, October 2022  - Hg 13.5>> 11.9> 11.6> 12.3> 11.2 -ferritin 28, sat 9% from June s/p  iron- rpt today  - symptomatic anemia, - schedule IV injectafer x 2 - 7/24  # Mr. Arroyo has significant family h/o of mother and maternal aunt and uncle with h/o renal cell ca ( unknown subtype). He has prostate ca [place] 6 under observation- will repeat PSA today, saw Dr. Bear last month.  PSA 3.4  Genetic testing with [facility] - negative   # CT scan HARSH nodule - repeat CT scan in May 2024, suspected infection, resolved infiltrate   cbc chem irons and PSA  Blood drawn in office. Ordered and reviewed.  Case and mgmt discussed with Dr. Charles

## 2025-03-10 NOTE — DISCUSSION/SUMMARY
[de-identified] : Chief complaint Postoperative check  HPI Patient is a very pleasant 73-year-old here for 2-week postoperative check from a L3 L5 decompression.  The patient is doing well.  Some mild back stiffness.  He has no fevers or chills.  Is off the pain medication.  Has no issues with gait or balance or bowel bladder incontinence.  He is voiding well.  He is moving around well.  Patient is NAD, AAOX3 skin is intact, NTTP in LS SPINE mild pain with ROM 5/5 motor strength in BLE SILT L1-S1 BLE Negative straight leg raise Negative leslie equal patella/achilles reflex normal gait Well-healing incision  Treatment note I discussed at length with the patient into the condition.  The patient presents with 2-week status post a L3 L5 laminectomy.  The patient doing well clinically.  The patient will continue with spine precautions.  Will see the patient back in a month.  All the patient's questions were sought and answered.

## 2025-04-18 NOTE — HISTORY OF PRESENT ILLNESS
[de-identified] : Patient is a pleasant 73 y/o M who presents for second post operative visit for lumbar decompression. He   L3-L5 laminectomy  [de-identified] : Patient is alert and oriented x 3. Not in acute distress.  [de-identified] : No new imaging today. 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

## 2025-04-18 NOTE — HISTORY OF PRESENT ILLNESS
[de-identified] : Patient is a pleasant 71 y/o M who presents for second post operative visit for lumbar decompression. He   L3-L5 laminectomy  [de-identified] : Patient is alert and oriented x 3. Not in acute distress.  [de-identified] : No new imaging today. 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

## 2025-05-17 NOTE — DISCUSSION/SUMMARY
[de-identified] : Chief complaint Follow-up  HPI Patient is now 3-month status post a L3 L5 decompression.  Patient doing well.  No major complaints.  No fevers or chills no issues with gait or balance bowel bladder incontinence  Patient is NAD, AAOX3 skin is intact, NTTP in LS SPINE mild pain with ROM 5/5 motor strength in BLE SILT L1-S1 BLE POSITIVE straight leg raise Negative leslie equal patella/achilles reflex normal gait  Imaging No new imaging obtained today  Treatment note I discussed at length with the patient into the condition.  Now 3 months from a L3 L5 laminectomy.  Patient doing well clinically.  Will see the patient back on as-needed basis.  He has no activity limitations from my standpoint.  All the patient's questions were sought answered.

## 2025-05-30 NOTE — REVIEW OF SYSTEMS
[Fever] : no fever [Fatigue] : no fatigue [Chest Pain] : no chest pain [Shortness Of Breath] : no shortness of breath [Cough] : no cough [Headache] : no headache [Fainting] : no fainting [de-identified] : Abscess to the right flank x 2 weeks

## 2025-05-30 NOTE — PHYSICAL EXAM
[No Acute Distress] : no acute distress [No Respiratory Distress] : no respiratory distress  [No Accessory Muscle Use] : no accessory muscle use [Normal Rate] : normal rate  [Regular Rhythm] : with a regular rhythm [Normal Affect] : the affect was normal [Alert and Oriented x3] : oriented to person, place, and time [Normal Insight/Judgement] : insight and judgment were intact [de-identified] : Abscess noted to right flank measuring about 2cm by 3.5 cm. site noted to be red and abscess hard to the touch. No drainage. Black head noted towards the top of the abscess. Tender to the touch.      -

## 2025-07-14 NOTE — DISCUSSION/SUMMARY
[de-identified] : Chief complaint Follow-up  HPI Patient is a very pleasant 72-year-old who is now approximately 5 months from an L3 L5 decompression.  Patient had been doing relatively well.  The patient reports some worsening symptoms with pain coming down the the back.  The leg symptoms have improved.  No fevers or chills.  No issues with gait or balance bowel bladder incontinence.  Has been doing most activities of daily living.  Patient is NAD, AAOX3 skin is intact, NTTP in LS SPINE mild pain with ROM 5/5 motor strength in BLE SILT L1-S1 BLE POSITIVE straight leg raise Negative leslie equal patella/achilles reflex normal gait  Imaging No new images obtained today  Treatment note I discussed at length with the patient nature the condition.  The patient presents with worsening back pain after L3 L5 laminectomy 5 months ago.  We discussed his previous MRI and discussed that he had degenerative disc disease at multiple levels.  It is possible that he is aggravated some of these discs.  Will get updated MRI.  He will continue with therapy and medications.  All the patient's questions were answered.

## 2025-07-30 NOTE — PHYSICAL EXAM
[Normal Conjunctiva] : normal conjunctiva [Normal S1, S2] : normal S1, S2 [Clear Lung Fields] : clear lung fields [Soft] : abdomen soft [Non Tender] : non-tender [Normal Gait] : normal gait [No Rash] : no rash [No Focal Deficits] : no focal deficits [de-identified] : Appears in no distress lying flat [de-identified] : Normocephalic [de-identified] : No neck vein distention.  No carotid bruit [de-identified] : No murmur no gallop no diastolic sounds. [de-identified] : Full range of motion [de-identified] : No peripheral edema.  Dorsalis pedis pulses +2 bilaterally

## 2025-07-30 NOTE — DISCUSSION/SUMMARY
[de-identified] : Chief complaint Follow-up   HPI Patient is a very pleasant 70-year-old here for follow-up of his postop MRI.  We performed an L3 L5 decompression several months ago and the patient had some worsening back pain.  No fevers or chills.  No issues with gait or balance bowel bladder incontinence.  He is weaned off the gabapentin the Valium is taken diclofenac as needed.  His back pain is the predominant complaint today but overall he is feeling better  Patient is NAD, AAOX3 skin is intact, NTTP in LS SPINE mild pain with ROM 5/5 motor strength in BLE Negative straight leg raise Negative leslie equal patella/achilles reflex normal gait  I personally reviewed MRI from [facility] which demonstrates decompression at L3-4 L4-L5.  There is a new onset disc herniation at L3-L4 right-sided.  There is Modic type changes at the L4 endplate  I discussed at length with the patient into the condition.  Her back pain most likely is due to the L4 inflammation of the endplate.  He does have a mild disc herniation at L3-L4.  At this time I would recommend continued conservative treatment.  The patient needs to wean off the NSAIDs as he is on a blood thinner.  Can continue with Tylenol and muscle relaxers.  He is a candidate for repeat injections moving forward.  He will let us know if he is ready for the injection.  All the patient's questions were answered.

## 2025-07-30 NOTE — HISTORY OF PRESENT ILLNESS
[FreeTextEntry1] : 72-year-old male Routine follow-up for atrial fibrillation hypertension hyperlipidemia obesity ascending aortic enlargement.  Augustin underwent spine surgery in 2/25.  Although there was a relief of some symptoms he has had recurrent back pain attributed to additional disc disease.  He denies symptoms of chest pain shortness of breath palpitations or syncope.

## 2025-07-30 NOTE — PHYSICAL EXAM
[Normal Conjunctiva] : normal conjunctiva [Normal S1, S2] : normal S1, S2 [Clear Lung Fields] : clear lung fields [Soft] : abdomen soft [Non Tender] : non-tender [Normal Gait] : normal gait [No Rash] : no rash [No Focal Deficits] : no focal deficits [de-identified] : Appears in no distress lying flat [de-identified] : Normocephalic [de-identified] : No neck vein distention.  No carotid bruit [de-identified] : No murmur no gallop no diastolic sounds. [de-identified] : Full range of motion [de-identified] : No peripheral edema.  Dorsalis pedis pulses +2 bilaterally